# Patient Record
Sex: FEMALE
[De-identification: names, ages, dates, MRNs, and addresses within clinical notes are randomized per-mention and may not be internally consistent; named-entity substitution may affect disease eponyms.]

---
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
## 2019-01-01 ENCOUNTER — TRANSCRIPTION ENCOUNTER (OUTPATIENT)
Age: 19
End: 2019-01-01

## 2019-01-01 ENCOUNTER — EMERGENCY (EMERGENCY)
Facility: HOSPITAL | Age: 19
LOS: 1 days | Discharge: HOME | End: 2019-01-01
Admitting: EMERGENCY MEDICINE
Payer: COMMERCIAL

## 2019-01-01 VITALS
RESPIRATION RATE: 14 BRPM | DIASTOLIC BLOOD PRESSURE: 78 MMHG | OXYGEN SATURATION: 99 % | WEIGHT: 134.92 LBS | HEART RATE: 81 BPM | TEMPERATURE: 98 F | SYSTOLIC BLOOD PRESSURE: 123 MMHG

## 2019-01-01 VITALS
OXYGEN SATURATION: 99 % | DIASTOLIC BLOOD PRESSURE: 80 MMHG | RESPIRATION RATE: 16 BRPM | SYSTOLIC BLOOD PRESSURE: 118 MMHG | HEART RATE: 78 BPM
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
## 2019-01-01 DIAGNOSIS — R68.84 JAW PAIN: ICD-10-CM

## 2019-01-01 DIAGNOSIS — S01.81XA LACERATION WITHOUT FOREIGN BODY OF OTHER PART OF HEAD, INITIAL ENCOUNTER: ICD-10-CM
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
## 2019-01-01 PROCEDURE — 70480 CT ORBIT/EAR/FOSSA W/O DYE: CPT | Mod: 26,59

## 2019-01-01 PROCEDURE — 99284 EMERGENCY DEPT VISIT MOD MDM: CPT

## 2019-01-01 PROCEDURE — 70486 CT MAXILLOFACIAL W/O DYE: CPT | Mod: 26

## 2019-01-01 PROCEDURE — 70450 CT HEAD/BRAIN W/O DYE: CPT | Mod: 26

## 2019-01-01 RX ORDER — IBUPROFEN 200 MG
600 TABLET ORAL ONCE
Qty: 0 | Refills: 0 | Status: COMPLETED | OUTPATIENT
Start: 2019-01-01 | End: 2019-01-01

## 2019-01-01 RX ORDER — IBUPROFEN 200 MG
1 TABLET ORAL
Qty: 20 | Refills: 0 | OUTPATIENT
Start: 2019-01-01

## 2019-01-01 RX ADMIN — Medication 600 MILLIGRAM(S): at 15:14

## 2019-01-01 RX ADMIN — Medication 1 TABLET(S): at 15:08

## 2019-01-01 NOTE — ED PROVIDER NOTE - MEDICAL DECISION MAKING DETAILS
19 y/o F presents to the ED c/o a a chin laceration, a HA and B/L jaw pain s/p mechanical trip and fall yesterday at 2 am. Will do CT head,  CT Maxillofacial and CT Temporal bones. Laceration repaired by Dr. Walton. Will give Augmentin, Ibuprofen and f/u with Dr. Walton in office.

## 2019-01-01 NOTE — ED ADULT NURSE NOTE - OBJECTIVE STATEMENT
BIBmother with c/o chin laceration and jaw pain s/p mechanical ground level fall at approx 2am this morning. Pt was seen at urgent care [facility]TA who sent her here for further assessment. + pain at b/l jaw joint, no malocclusion, no dental pain. no obvious deformity. + laceration to chin, approx 2cm, dry, no active bleeding. Pt and mother requesting plastics consult. +tetanus UTD. Will monitor and f/u as indicated.

## 2019-01-01 NOTE — ED PROVIDER NOTE - CARE PROVIDER_API CALL
Keith Walton), Plastic Surgery; Surgery  [address] [place] [address]  Phone: [phone]  Fax: [phone]

## 2019-01-01 NOTE — ED ADULT NURSE NOTE - NSIMPLEMENTINTERV_GEN_ALL_ED
Implemented All Universal Safety Interventions:  Stout to call system. Call bell, personal items and telephone within reach. Instruct patient to call for assistance. Room bathroom lighting operational. Non-slip footwear when patient is off stretcher. Physically safe environment: no spills, clutter or unnecessary equipment. Stretcher in lowest position, wheels locked, appropriate side rails in place.

## 2019-01-01 NOTE — ED PROVIDER NOTE - OBJECTIVE STATEMENT
19 y/o F with no significant PMHx presents to the ED c/o a chin laceration, a HA and B/L jaw pain s/p mechanical trip and fall yesterday at 2 am. Pt admits to drinking EtOH however seems unsure of LOC. Denies nausea, vomiting, weakness, numbness, vision changes, gait or balance changes. Tetanus UTD.

## 2019-01-01 NOTE — ED PROVIDER NOTE - NSFOLLOWUPINSTRUCTIONS_ED_ALL_ED_FT
Follow up with Dr. Walton in office. Ice area and rest. Continue Augmentin as prescribed. Return to ER if symptoms worsen or return.

## 2019-07-29 ENCOUNTER — RECORD ABSTRACTING (OUTPATIENT)
Age: 19
End: 2019-07-29

## 2019-08-19 ENCOUNTER — APPOINTMENT (OUTPATIENT)
Dept: PEDIATRICS | Facility: CLINIC | Age: 19
End: 2019-08-19

## 2019-10-11 ENCOUNTER — MED ADMIN CHARGE (OUTPATIENT)
Age: 19
End: 2019-10-11

## 2019-10-11 ENCOUNTER — APPOINTMENT (OUTPATIENT)
Dept: PEDIATRICS | Facility: CLINIC | Age: 19
End: 2019-10-11
Payer: COMMERCIAL

## 2019-10-11 VITALS
HEIGHT: 65.7 IN | DIASTOLIC BLOOD PRESSURE: 86 MMHG | SYSTOLIC BLOOD PRESSURE: 143 MMHG | BODY MASS INDEX: 22.12 KG/M2 | WEIGHT: 136 LBS | HEART RATE: 72 BPM

## 2019-10-11 VITALS — BODY MASS INDEX: 22.12 KG/M2 | HEART RATE: 72 BPM | WEIGHT: 136 LBS | HEIGHT: 65.7 IN

## 2019-10-11 DIAGNOSIS — Z86.19 PERSONAL HISTORY OF OTHER INFECTIOUS AND PARASITIC DISEASES: ICD-10-CM

## 2019-10-11 DIAGNOSIS — Z00.00 ENCOUNTER FOR GENERAL ADULT MEDICAL EXAMINATION W/OUT ABNORMAL FINDINGS: ICD-10-CM

## 2019-10-11 DIAGNOSIS — Z72.821 INADEQUATE SLEEP HYGIENE: ICD-10-CM

## 2019-10-11 DIAGNOSIS — Z83.2 FAMILY HISTORY OF DISEASES OF THE BLOOD AND BLOOD-FORMING ORGANS AND CERTAIN DISORDERS INVOLVING THE IMMUNE MECHANISM: ICD-10-CM

## 2019-10-11 DIAGNOSIS — Z78.9 OTHER SPECIFIED HEALTH STATUS: ICD-10-CM

## 2019-10-11 DIAGNOSIS — Z83.49 FAMILY HISTORY OF OTHER ENDOCRINE, NUTRITIONAL AND METABOLIC DISEASES: ICD-10-CM

## 2019-10-11 PROCEDURE — 96127 BRIEF EMOTIONAL/BEHAV ASSMT: CPT

## 2019-10-11 PROCEDURE — 90686 IIV4 VACC NO PRSV 0.5 ML IM: CPT

## 2019-10-11 PROCEDURE — 90715 TDAP VACCINE 7 YRS/> IM: CPT

## 2019-10-11 PROCEDURE — 90472 IMMUNIZATION ADMIN EACH ADD: CPT

## 2019-10-11 PROCEDURE — 99395 PREV VISIT EST AGE 18-39: CPT | Mod: 25

## 2019-10-11 PROCEDURE — 96160 PT-FOCUSED HLTH RISK ASSMT: CPT | Mod: 59

## 2019-10-11 PROCEDURE — 90471 IMMUNIZATION ADMIN: CPT

## 2019-10-11 PROCEDURE — 92551 PURE TONE HEARING TEST AIR: CPT

## 2019-10-11 RX ORDER — FEXOFENADINE HCL 60 MG
TABLET ORAL
Refills: 0 | Status: ACTIVE | COMMUNITY

## 2019-10-11 RX ORDER — ETONOGESTREL 68 MG/1
68 IMPLANT SUBCUTANEOUS
Refills: 0 | Status: ACTIVE | COMMUNITY

## 2019-10-11 NOTE — PHYSICAL EXAM
[Alert] : alert [No Acute Distress] : no acute distress [Normocephalic] : normocephalic [EOMI Bilateral] : EOMI bilateral [Clear tympanic membranes with bony landmarks and light reflex present bilaterally] : clear tympanic membranes with bony landmarks and light reflex present bilaterally  [Pink Nasal Mucosa] : pink nasal mucosa [Nonerythematous Oropharynx] : nonerythematous oropharynx [Supple, full passive range of motion] : supple, full passive range of motion [No Palpable Masses] : no palpable masses [Clear to Ausculatation Bilaterally] : clear to auscultation bilaterally [Regular Rate and Rhythm] : regular rate and rhythm [Normal S1, S2 audible] : normal S1, S2 audible [No Murmurs] : no murmurs [+2 Femoral Pulses] : +2 femoral pulses [Soft] : soft [NonTender] : non tender [Non Distended] : non distended [Normoactive Bowel Sounds] : normoactive bowel sounds [No Hepatomegaly] : no hepatomegaly [No Splenomegaly] : no splenomegaly [Devonte: ____] : Devonte [unfilled] [Devonte: _____] : Devonte [unfilled] [No Abnormal Lymph Nodes Palpated] : no abnormal lymph nodes palpated [Normal Muscle Tone] : normal muscle tone [No Gait Asymmetry] : no gait asymmetry [No pain or deformities with palpation of bone, muscles, joints] : no pain or deformities with palpation of bone, muscles, joints [+2 Patella DTR] : +2 patella DTR [Cranial Nerves Grossly Intact] : cranial nerves grossly intact [No Rash or Lesions] : no rash or lesions [de-identified] : left lower lumbar area with mild scoliosis to the left

## 2019-10-11 NOTE — BEGINNING OF VISIT
[Patient] : patient [FreeTextEntry1] : 20 yo girl here for well visit at [employer] major/finance

## 2019-10-11 NOTE — DISCUSSION/SUMMARY
[] : The components of the vaccine(s) to be administered today are listed in the plan of care. The disease(s) for which the vaccine(s) are intended to prevent and the risks have been discussed with the caretaker.  The risks are also included in the appropriate vaccination information statements which have been provided to the patient's caregiver.  The caregiver has given consent to vaccinate. [FreeTextEntry1] : 18 yo girl with normal exam. She has a very minimal scoliosis. Today she rec'd the Tdap and flu vaccines. vis given and explained. Discussed use of marijuana, alcohol and to avoid all vaping . Fasting labs ordered. f/u 1 year.

## 2020-01-06 ENCOUNTER — OTHER (OUTPATIENT)
Age: 20
End: 2020-01-06

## 2020-01-06 ENCOUNTER — APPOINTMENT (OUTPATIENT)
Dept: PEDIATRICS | Facility: CLINIC | Age: 20
End: 2020-01-06
Payer: COMMERCIAL

## 2020-01-06 VITALS
SYSTOLIC BLOOD PRESSURE: 122 MMHG | WEIGHT: 132 LBS | DIASTOLIC BLOOD PRESSURE: 86 MMHG | HEART RATE: 93 BPM | TEMPERATURE: 98 F

## 2020-01-06 DIAGNOSIS — Z87.09 PERSONAL HISTORY OF OTHER DISEASES OF THE RESPIRATORY SYSTEM: ICD-10-CM

## 2020-01-06 LAB
FLUAV SPEC QL CULT: NEGATIVE
FLUBV AG SPEC QL IA: POSITIVE
S PYO AG SPEC QL IA: NEGATIVE

## 2020-01-06 PROCEDURE — 99214 OFFICE O/P EST MOD 30 MIN: CPT

## 2020-01-06 PROCEDURE — 87804 INFLUENZA ASSAY W/OPTIC: CPT | Mod: QW

## 2020-01-06 PROCEDURE — 87880 STREP A ASSAY W/OPTIC: CPT | Mod: QW

## 2020-01-06 NOTE — DISCUSSION/SUMMARY
[FreeTextEntry1] : 18 yo girl with flu like symptoms and pharyngitis. rapid strep test is negative . regular throat culture will be sent to the lab. rapid flu test is positive for influenza B.  will treat with tamiflu

## 2020-01-06 NOTE — PHYSICAL EXAM
[Tired appearing] : tired appearing [Supple] : supple [FROM] : full passive range of motion [NL] : warm [FreeTextEntry7] : clear lungs. + cough [de-identified] : no nodes

## 2020-01-06 NOTE — BEGINNING OF VISIT
[Patient] : patient [FreeTextEntry1] : 20 yo girl here  for fever that started yesterday of 101. had a hard time getting out  of bed. coughing, with sore throat. no v/d. + headache yesterday. took advil this morning and some yesterday. not much appetite.

## 2020-01-09 LAB — BACTERIA THROAT CULT: NORMAL

## 2020-07-07 ENCOUNTER — APPOINTMENT (OUTPATIENT)
Dept: PEDIATRICS | Facility: CLINIC | Age: 20
End: 2020-07-07
Payer: COMMERCIAL

## 2020-07-07 VITALS
TEMPERATURE: 98.2 F | WEIGHT: 139.5 LBS | HEART RATE: 65 BPM | DIASTOLIC BLOOD PRESSURE: 75 MMHG | SYSTOLIC BLOOD PRESSURE: 125 MMHG

## 2020-07-07 DIAGNOSIS — D68.51 ACTIVATED PROTEIN C RESISTANCE: ICD-10-CM

## 2020-07-07 DIAGNOSIS — R68.89 OTHER GENERAL SYMPTOMS AND SIGNS: ICD-10-CM

## 2020-07-07 DIAGNOSIS — J10.1 INFLUENZA DUE TO OTHER IDENTIFIED INFLUENZA VIRUS WITH OTHER RESPIRATORY MANIFESTATIONS: ICD-10-CM

## 2020-07-07 PROCEDURE — 99214 OFFICE O/P EST MOD 30 MIN: CPT

## 2020-07-07 RX ORDER — OSELTAMIVIR PHOSPHATE 75 MG/1
75 CAPSULE ORAL
Qty: 10 | Refills: 0 | Status: DISCONTINUED | COMMUNITY
Start: 2020-01-06 | End: 2020-07-07

## 2020-07-07 NOTE — HISTORY OF PRESENT ILLNESS
[FreeTextEntry6] : 3 months of episodes of feeling of chest tightness.  Not related to exercise.  Lasts for 15 to 20 minutes and happens several times each day to several times each week.  No other problems.  States she has anxiety about covid-19. [de-identified] : Feeling of chest tightness

## 2020-07-13 ENCOUNTER — OUTPATIENT (OUTPATIENT)
Dept: OUTPATIENT SERVICES | Age: 20
LOS: 1 days | Discharge: HOME | End: 2020-07-13

## 2020-07-14 ENCOUNTER — APPOINTMENT (OUTPATIENT)
Dept: PEDIATRIC CARDIOLOGY | Facility: CLINIC | Age: 20
End: 2020-07-14
Payer: COMMERCIAL

## 2020-07-14 VITALS
SYSTOLIC BLOOD PRESSURE: 127 MMHG | HEART RATE: 68 BPM | WEIGHT: 137.79 LBS | BODY MASS INDEX: 22.14 KG/M2 | OXYGEN SATURATION: 100 % | DIASTOLIC BLOOD PRESSURE: 83 MMHG | HEIGHT: 66.14 IN | RESPIRATION RATE: 18 BRPM

## 2020-07-14 DIAGNOSIS — F41.9 ANXIETY DISORDER, UNSPECIFIED: ICD-10-CM

## 2020-07-14 DIAGNOSIS — R07.89 OTHER CHEST PAIN: ICD-10-CM

## 2020-07-14 DIAGNOSIS — Z82.49 FAMILY HISTORY OF ISCHEMIC HEART DISEASE AND OTHER DISEASES OF THE CIRCULATORY SYSTEM: ICD-10-CM

## 2020-07-14 PROCEDURE — 93306 TTE W/DOPPLER COMPLETE: CPT

## 2020-07-14 PROCEDURE — 93000 ELECTROCARDIOGRAM COMPLETE: CPT

## 2020-07-14 PROCEDURE — 99205 OFFICE O/P NEW HI 60 MIN: CPT | Mod: 25

## 2020-07-14 NOTE — CLINICAL NARRATIVE
[Up to Date] : Up to Date [FreeTextEntry2] : Arrives with hx of chest pain possibly related to anxiety, family hx of HCM.

## 2020-07-14 NOTE — REASON FOR VISIT
[Initial Consultation] : an initial consultation for [Chest Pain] : chest pain [Patient] : patient [Mother] : mother [Medical Records] : medical records [FreeTextEntry3] : Screening for Cardiovascular Disorders, family Hx of HCM

## 2020-07-14 NOTE — DISCUSSION/SUMMARY
[FreeTextEntry1] : SID has a normal cardiac exam, electrocardiogram and echocardiogram. There is no evidence of a bicuspid aortic valve on today's echocardiogram.  I reassured the patient and her  family that SID's heart is structurally and functionally normal without any evidence of a cardiac abnormality.  \par I feel that SID's symptoms are likely related to anxiety and recommended that she seek treatment for from a mental health professional in order to improve her quality of life. \par Additionally, I recommended that SID's father be sen at the [facility] in order to undergo genetic testing for his HCM.\par All physical activities may be performed without restriction and there is no need for routine follow-up unless future concerns arise.\par   [Needs SBE Prophylaxis] : [unfilled] does not need bacterial endocarditis prophylaxis [PE + No Restrictions] : [unfilled] may participate in the entire physical education program without restriction, including all varsity competitive sports.

## 2020-07-14 NOTE — CARDIOLOGY SUMMARY
[Today's Date] : [unfilled] [FreeTextEntry1] : Normal sinus rhythm without preexcitation or ectopy. Heart rate (bpm): 67 [FreeTextEntry2] :  1. Tricommissural aortic valve.\par  2. Normal left ventricular size, morphology and systolic function.\par  3. Normal left ventricular diastolic function.\par  4. Normal right ventricular morphology with qualitatively normal size and systolic function.\par  5. No pericardial effusion.\par

## 2020-07-14 NOTE — HISTORY OF PRESENT ILLNESS
[FreeTextEntry1] : SID is a 19 year female who presents for cardiac evaluation of chest tightness for 1.5 months. SID's father and older sister both have hypertrophic cardiomyopathy but SID was screened and was negative (by report). She was noted to have a perfectly functioning bicuspid aortic valve on her screening echocardiogram. \par SID states that she feels tightness and pressure over the anterior part of her chest.  The episodes occur mostly in the evening when she in trying to go to sleep and is associated with difficulty breathing and an increased heart rate. It lasts from 5-30 minutes.  The symptoms resolve with breathing exercises.  SID states that the episodes are worse when she is stressed and anxious . She never complains of shortness of breath, chest pain, presyncope, or syncope with exertion.\par

## 2020-07-14 NOTE — PHYSICAL EXAM
[General Appearance - Alert] : alert [General Appearance - In No Acute Distress] : in no acute distress [General Appearance - Well Developed] : well developed [General Appearance - Well Nourished] : well nourished [Appearance Of Head] : the head was normocephalic [General Appearance - Well-Appearing] : well appearing [Sclera] : the conjunctiva were normal [Facies] : there were no dysmorphic facial features [Examination Of The Oral Cavity] : mucous membranes were moist and pink [Outer Ear] : the ears and nose were normal in appearance [Auscultation Breath Sounds / Voice Sounds] : breath sounds clear to auscultation bilaterally [Normal Chest Appearance] : the chest was normal in appearance [Heart Sounds] : normal S1 and S2 [Heart Rate And Rhythm] : normal heart rate and rhythm [Apical Impulse] : quiet precordium with normal apical impulse [No Murmur] : no murmurs  [Heart Sounds Pericardial Friction Rub] : no pericardial rub [Heart Sounds Gallop] : no gallops [Heart Sounds Click] : no clicks [Arterial Pulses] : normal upper and lower extremity pulses with no pulse delay [Capillary Refill Test] : normal capillary refill [Edema] : no edema [Bowel Sounds] : normal bowel sounds [Abdomen Soft] : soft [Nondistended] : nondistended [Abdomen Tenderness] : non-tender [Nail Clubbing] : no clubbing  or cyanosis of the fingers [Motor Tone] : normal muscle strength and tone [Cervical Lymph Nodes Enlarged Anterior] : The anterior cervical nodes were normal [Cervical Lymph Nodes Enlarged Posterior] : The posterior cervical nodes were normal [] : no rash [Skin Turgor] : normal turgor [Skin Lesions] : no lesions [Demonstrated Behavior] : normal behavior [Demonstrated Behavior - Infant Nonreactive To Parents] : interactive [Mood] : mood and affect were appropriate for age

## 2020-07-14 NOTE — REVIEW OF SYSTEMS
[Chest Pain] : chest pain  or discomfort [Anxiety] : anxiety [Feeling Poorly] : not feeling poorly (malaise) [Fever] : no fever [Wgt Loss (___ Lbs)] : no recent weight loss [Pallor] : not pale [Eye Discharge] : no eye discharge [Redness] : no redness [Change in Vision] : no change in vision [Nasal Stuffiness] : no nasal congestion [Sore Throat] : no sore throat [Earache] : no earache [Loss Of Hearing] : no hearing loss [Cyanosis] : no cyanosis [Edema] : no edema [Diaphoresis] : not diaphoretic [Palpitations] : no palpitations [Exercise Intolerance] : no persistence of exercise intolerance [Orthopnea] : no orthopnea [Fast HR] : no tachycardia [Tachypnea] : not tachypneic [Wheezing] : no wheezing [Cough] : no cough [Shortness Of Breath] : not expressed as feeling short of breath [Vomiting] : no vomiting [Diarrhea] : no diarrhea [Abdominal Pain] : no abdominal pain [Decrease In Appetite] : appetite not decreased [Fainting (Syncope)] : no fainting [Seizure] : no seizures [Headache] : no headache [Limping] : no limping [Dizziness] : no dizziness [Joint Pains] : no arthralgias [Joint Swelling] : no joint swelling [Wound problems] : no wound problems [Rash] : no rash [Easy Bruising] : no tendency for easy bruising [Easy Bleeding] : no ~M tendency for easy bleeding [Swollen Glands] : no lymphadenopathy [Nosebleeds] : no epistaxis [Sleep Disturbances] : ~T no sleep disturbances [Hyperactive] : no hyperactive behavior [Depression] : no depression [Failure To Thrive] : no failure to thrive [Short Stature] : short stature was not noted [Jitteriness] : no jitteriness [Heat/Cold Intolerance] : no temperature intolerance [Dec Urine Output] : no oliguria

## 2020-07-14 NOTE — CONSULT LETTER
[Today's Date] : [unfilled] [Name] : Name: [unfilled] [] : : ~~ [Today's Date:] : [unfilled] [Dear  ___:] : Dear Dr. [unfilled]: [Consult] : I had the pleasure of evaluating your patient, [unfilled]. My full evaluation follows. [Sincerely,] : Sincerely, [Consult - Single Provider] : Thank you very much for allowing me to participate in the care of this patient. If you have any questions, please do not hesitate to contact me. [FreeTextEntry4] : Dr. ETHAN JUNIOR MD [de-identified] : Debbie Abdi MD, FAAP, FACC\par \par Pediatric Cardiologist\par  of Pediatrics\par [facility]

## 2020-07-20 ENCOUNTER — TRANSCRIPTION ENCOUNTER (OUTPATIENT)
Age: 20
End: 2020-07-20

## 2020-12-23 PROBLEM — Z87.09 HISTORY OF PHARYNGITIS: Status: RESOLVED | Noted: 2020-01-06 | Resolved: 2020-12-23

## 2023-01-30 NOTE — ED PROVIDER NOTE - NSDCPRINTRESULTS_ED_ALL_ED
Patient requests all Lab and Radiology Results on their Discharge Instructions
Review of Systems    Constitutional: (-) fever   Eyes/ENT: (-) vision changes  Cardiovascular: (+) chest pain, (-) syncope (-) palpitations  Respiratory: (-) cough, (+) shortness of breath  Gastrointestinal: (-) vomiting, (-) diarrhea (-) abdominal pain  Genitourinary:  (-) dysuria   Musculoskeletal: (-) neck pain, (-) back pain, (-) leg pain/swelling  Integumentary: (-) rash, (-) edema  Neurological: (+) headache, (-) altered mental status (-) confusion  Hematologic: (-) easy bruising

## 2024-02-19 NOTE — ED PROVIDER NOTE - PROGRESS NOTE
Neurology Medications [id number] 02:03 PM   Protocol Details In person appointment or virtual visit in the past 6 mos or appointment in next 3 mos          LOV 1/17/24     Last Refill 11/15/23 #180 Refill 0     No future appointments.    
Improved.